# Patient Record
Sex: MALE | Race: WHITE | NOT HISPANIC OR LATINO | Employment: OTHER | URBAN - METROPOLITAN AREA
[De-identification: names, ages, dates, MRNs, and addresses within clinical notes are randomized per-mention and may not be internally consistent; named-entity substitution may affect disease eponyms.]

---

## 2020-09-18 ENCOUNTER — HOSPITAL ENCOUNTER (EMERGENCY)
Facility: HOSPITAL | Age: 65
Discharge: HOME | End: 2020-09-18
Attending: EMERGENCY MEDICINE | Admitting: EMERGENCY MEDICINE
Payer: MEDICARE

## 2020-09-18 VITALS
OXYGEN SATURATION: 99 % | WEIGHT: 208.78 LBS | DIASTOLIC BLOOD PRESSURE: 94 MMHG | BODY MASS INDEX: 33.55 KG/M2 | HEART RATE: 60 BPM | RESPIRATION RATE: 16 BRPM | HEIGHT: 66 IN | TEMPERATURE: 98.3 F | SYSTOLIC BLOOD PRESSURE: 208 MMHG

## 2020-09-18 DIAGNOSIS — K08.89 DENTALGIA: Primary | ICD-10-CM

## 2020-09-18 DIAGNOSIS — K04.7 PERIAPICAL ABSCESS: ICD-10-CM

## 2020-09-18 PROCEDURE — 96372 THER/PROPH/DIAG INJ SC/IM: CPT

## 2020-09-18 PROCEDURE — 99283 EMERGENCY DEPT VISIT LOW MDM: CPT

## 2020-09-18 PROCEDURE — 99283 EMERGENCY DEPT VISIT LOW MDM: CPT | Performed by: PHYSICIAN ASSISTANT

## 2020-09-18 RX ORDER — CLINDAMYCIN HYDROCHLORIDE 150 MG/1
300 CAPSULE ORAL ONCE
Status: COMPLETED | OUTPATIENT
Start: 2020-09-18 | End: 2020-09-18

## 2020-09-18 RX ORDER — ATENOLOL 50 MG/1
50 TABLET ORAL DAILY
COMMUNITY

## 2020-09-18 RX ORDER — ASPIRIN 81 MG/1
81 TABLET, CHEWABLE ORAL DAILY
COMMUNITY

## 2020-09-18 RX ORDER — CLOPIDOGREL BISULFATE 75 MG/1
75 TABLET ORAL DAILY
COMMUNITY

## 2020-09-18 RX ORDER — CLINDAMYCIN HYDROCHLORIDE 150 MG/1
450 CAPSULE ORAL EVERY 8 HOURS SCHEDULED
Qty: 28 CAPSULE | Refills: 0 | Status: SHIPPED | OUTPATIENT
Start: 2020-09-18 | End: 2020-09-28

## 2020-09-18 RX ORDER — KETOROLAC TROMETHAMINE 30 MG/ML
15 INJECTION, SOLUTION INTRAMUSCULAR; INTRAVENOUS ONCE
Status: COMPLETED | OUTPATIENT
Start: 2020-09-18 | End: 2020-09-18

## 2020-09-18 RX ORDER — NAPROXEN 500 MG/1
500 TABLET ORAL 2 TIMES DAILY WITH MEALS
Qty: 30 TABLET | Refills: 0 | Status: SHIPPED | OUTPATIENT
Start: 2020-09-18

## 2020-09-18 RX ADMIN — KETOROLAC TROMETHAMINE 15 MG: 30 INJECTION, SOLUTION INTRAMUSCULAR at 12:26

## 2020-09-18 RX ADMIN — CLINDAMYCIN HYDROCHLORIDE 300 MG: 150 CAPSULE ORAL at 12:26

## 2020-09-18 NOTE — DISCHARGE INSTRUCTIONS
Please take Clindamycin as directed, take naproxen as needed for pain  Return to the ED for any new or worsening symptoms as discussed

## 2020-09-18 NOTE — ED PROVIDER NOTES
History  Chief Complaint   Patient presents with    Dental Injury     Broke a tooth yesterday eating carrots to the lower L side  Can't get into the dentist til Monday, and was hoping for some antibiotics and motrin  Takes plavix and asa     Maureenbenjamín Pena is a 76year-old male with history of hypertension and tobacco abuse arriving ambulatory to the ED for evaluation of dental pain beginning yesterday evening  Patient relates that he felt a tooth in the left lower jaw crack upon eating a carrot around 8pm yesterday evening  Patient states he is scheduled for follow up with his dentist on Monday for assessment but would like to be started on antibiotics before hand  He relates that he has been taking Ibuprofen with good pain relief  Patient denies history of formal root canal or procedures, or any prior trauma to this tooth  He admits to mild facial swelling overlying area of pain  Denies fevers, chills, trismus, trouble swallowing, neck pain or stiffness, shortness of breath, nausea, vomiting, rash  History provided by:  Patient  Dental Pain   Location:  Lower  Quality:  Sharp  Severity:  Moderate  Onset quality:  Sudden  Timing:  Constant  Progression:  Unchanged  Chronicity:  New  Context: poor dentition    Associated symptoms: facial swelling    Associated symptoms: no drooling, no fever, no headaches and no neck pain    Risk factors: smoking        Prior to Admission Medications   Prescriptions Last Dose Informant Patient Reported? Taking?   aspirin 81 mg chewable tablet   Yes Yes   Sig: Chew 81 mg daily   atenolol (TENORMIN) 50 mg tablet   Yes Yes   Sig: Take 50 mg by mouth daily   clopidogrel (PLAVIX) 75 mg tablet   Yes Yes   Sig: Take 75 mg by mouth daily      Facility-Administered Medications: None       Past Medical History:   Diagnosis Date    Hypertension        Past Surgical History:   Procedure Laterality Date    CARDIAC SURGERY  2012    stent       History reviewed   No pertinent family history  I have reviewed and agree with the history as documented  E-Cigarette/Vaping    E-Cigarette Use Never User      E-Cigarette/Vaping Substances    Nicotine No     THC No     CBD No     Flavoring No     Other No     Unknown No      Social History     Tobacco Use    Smoking status: Current Every Day Smoker     Packs/day: 0 50     Types: Cigarettes    Smokeless tobacco: Never Used   Substance Use Topics    Alcohol use: Yes     Frequency: 2-3 times a week     Drinks per session: 3 or 4    Drug use: Never       Review of Systems   Constitutional: Negative for chills, fatigue and fever  HENT: Positive for dental problem and facial swelling  Negative for drooling, ear discharge, ear pain, sore throat and trouble swallowing  Respiratory: Negative for cough and shortness of breath  Gastrointestinal: Negative for nausea and vomiting  Musculoskeletal: Negative for neck pain and neck stiffness  Skin: Negative for pallor and rash  Neurological: Negative for weakness and headaches  All other systems reviewed and are negative  Physical Exam  Physical Exam  Vitals signs and nursing note reviewed  Constitutional:       General: He is not in acute distress  Appearance: He is well-developed  He is not ill-appearing or toxic-appearing  Comments: 76year-old male seated on exam table  Appears in no significant distress   HENT:      Head: Normocephalic and atraumatic  Mouth/Throat:      Mouth: Mucous membranes are moist       Dentition: Abnormal dentition  Dental tenderness and dental caries present  Pharynx: Oropharynx is clear  No pharyngeal swelling  Comments: Poor dentition with numerous tooth extractions  Teeth are discolored yellow  Malodorous breath  Handling oral secretions, no drooling  Eyes:      Conjunctiva/sclera: Conjunctivae normal       Pupils: Pupils are equal, round, and reactive to light     Neck:      Musculoskeletal: Normal range of motion and neck supple  Comments: No widening of the neck or cervical adenopathy  Cardiovascular:      Rate and Rhythm: Normal rate and regular rhythm  Heart sounds: Normal heart sounds  Pulmonary:      Effort: Pulmonary effort is normal  No respiratory distress  Breath sounds: Normal breath sounds  No wheezing  Comments: Speaking in full sentences, no evidence of respiratory distress  Abdominal:      General: Bowel sounds are normal  There is no distension  Palpations: Abdomen is soft  Tenderness: There is no abdominal tenderness  Musculoskeletal: Normal range of motion  General: No tenderness  Skin:     General: Skin is warm and dry  Capillary Refill: Capillary refill takes less than 2 seconds  Neurological:      General: No focal deficit present  Mental Status: He is alert  Mental status is at baseline  Sensory: No sensory deficit  Vital Signs  ED Triage Vitals [09/18/20 1107]   Temperature Pulse Respirations Blood Pressure SpO2   98 3 °F (36 8 °C) 60 16 (!) 208/94 99 %      Temp Source Heart Rate Source Patient Position - Orthostatic VS BP Location FiO2 (%)   Oral Monitor Lying Right arm --      Pain Score       9           Vitals:    09/18/20 1107   BP: (!) 208/94   Pulse: 60   Patient Position - Orthostatic VS: Lying         Visual Acuity      ED Medications  Medications   ketorolac (TORADOL) injection 15 mg (15 mg Intramuscular Given 9/18/20 1226)   clindamycin (CLEOCIN) capsule 300 mg (300 mg Oral Given 9/18/20 1226)       Diagnostic Studies  Results Reviewed     None                 No orders to display              Procedures  Procedures         ED Course       11:50AM Patient seen and assessed  Allergies reviewed  Patient given Toradol and Clindamycin in the emergency department and will be discharged with clindamycin and naproxen  Encouraged to keep dentist appointment as scheduled on Monday  Return parameters given   Patient stable for discharge home       Identification of Seniors at Risk      Most Recent Value   (ISAR) Identification of Seniors at Risk   Before the illness or injury that brought you to the Emergency, did you need someone to help you on a regular basis? 0 Filed at: 09/18/2020 1123   In the last 24 hours, have you needed more help than usual?  0 Filed at: 09/18/2020 1123   Have you been hospitalized for one or more nights during the past 6 months? 0 Filed at: 09/18/2020 1123   In general, do you see well?  0 Filed at: 09/18/2020 1123   In general, do you have serious problems with your memory? 0 Filed at: 09/18/2020 1123   Do you take more than three different medications every day? 1 Filed at: 09/18/2020 1123   ISAR Score  1 Filed at: 09/18/2020 1123                  MDM  Number of Diagnoses or Management Options  Dentalgia: new and does not require workup  Periapical abscess: new and does not require workup  Diagnosis management comments: This is a 76year-old male with history of hypertension and tobacco use arriving ambulatory to the ED for evaluation of dental pain and concern for dental abscess  Patient reports that he cracked his tooth yesterday evening while biting a carrot  Patient denies fevers, chills, trismus, trouble breathing or swallowing, rash  He relates he does have follow up scheduled with his Dentist on Monday  Differential diagnosis includes but not limited to: dentalgia, periapical abscess, dental fracture; history and examination are not suspicious for facial cellulitis, Ronny's angina, peritonsillar abscess, or retropharyngeal abscess given non-toxic appearance, afebrile, no constitutional complaints    Initial ED plan: abx, pain control, discharge    Final ED Assessment: Vital signs stable on arrival, examination as documented above  Patient was given Clindamycin given penicillin allergy, as well as toradol in the ED which were well tolerated and provided adequate symptomatic relief   Patient was encouraged to keep follow up with his Dentist as scheduled on Monday  Patient aware that he should return immediately to the emergency department if he develops any new or worsening symptoms  Advised smoking cessation if possible  Patient is understanding and agreeable  Stable for discharge home  Amount and/or Complexity of Data Reviewed  Review and summarize past medical records: yes  Discuss the patient with other providers: yes  Independent visualization of images, tracings, or specimens: yes    Risk of Complications, Morbidity, and/or Mortality  Presenting problems: low  Diagnostic procedures: low  Management options: low    Patient Progress  Patient progress: stable      Disposition  Final diagnoses:   Dentalgia   Periapical abscess     Time reflects when diagnosis was documented in both MDM as applicable and the Disposition within this note     Time User Action Codes Description Comment    9/18/2020 12:45 PM Stacey [place] Add [K08 [id number]] [address] [address] [facility]     9/18/2020 12:45 PM Stacey [place] Add [K04 7] Periapical abscess       ED Disposition     ED Disposition Condition Date/Time Comment    Discharge Stable Fri Sep 18, 2020 12:45 PM Remington Mccord discharge to home/self care              Follow-up Information     Follow up With Specialties Details Why Contact Info Additional Information    Your Dentist   On Monday as scheduled      [address] [facility] Emergency Department Emergency Medicine  If symptoms worsen [address] [facility] [address] ED, [address], [address]          Discharge Medication List as of 9/18/2020 12:51 PM      START taking these medications    Details   clindamycin (CLEOCIN) 150 mg capsule Take 3 capsules (450 mg total) by mouth every 8 (eight) hours for 10 days, Starting Fri 9/18/2020, Until Mon 9/28/2020, Normal      naproxen (NAPROSYN) 500 mg tablet Take 1 tablet (500 mg total) by mouth 2 (two) times a day with meals, Starting Fri 9/18/2020, Normal         CONTINUE these medications which have NOT CHANGED    Details   aspirin 81 mg chewable tablet Chew 81 mg daily, Historical Med      atenolol (TENORMIN) 50 mg tablet Take 50 mg by mouth daily, Historical Med      clopidogrel (PLAVIX) 75 mg tablet Take 75 mg by mouth daily, Historical Med           No discharge procedures on file      PDMP Review     None          ED Provider  Electronically Signed by           Blanquita Mcduffie PA-C  09/19/20 2566

## 2020-10-17 NOTE — ED ATTENDING ATTESTATION
9/18/2020  ITyler MD, saw and evaluated the patient  I have discussed the patient with the resident/non-physician practitioner and agree with the resident's/non-physician practitioner's findings, Plan of Care, and MDM as documented in the resident's/non-physician practitioner's note, except where noted  All available labs and Radiology studies were reviewed  I was present for key portions of any procedure(s) performed by the resident/non-physician practitioner and I was immediately available to provide assistance  At this point I agree with the current assessment done in the Emergency Department  I have conducted an independent evaluation of this patient a history and physical is as follows:       77 yo m with dentalgia   Ros was complete and otherwise negative  on exam has diffuse poor dentition, with perioral edema and tenderness     assessment and plan: dental pain will rx with abx and refer to dentist   ED Course         Critical Care Time  Procedures

## 2025-07-30 ENCOUNTER — OFFICE VISIT (OUTPATIENT)
Dept: URGENT CARE | Facility: CLINIC | Age: 70
End: 2025-07-30
Payer: COMMERCIAL

## 2025-07-30 VITALS
DIASTOLIC BLOOD PRESSURE: 86 MMHG | RESPIRATION RATE: 19 BRPM | SYSTOLIC BLOOD PRESSURE: 140 MMHG | WEIGHT: 196.6 LBS | BODY MASS INDEX: 31.73 KG/M2 | HEART RATE: 73 BPM | TEMPERATURE: 98.6 F | OXYGEN SATURATION: 96 %

## 2025-07-30 DIAGNOSIS — R23.3 ABNORMAL BRUISING: Primary | ICD-10-CM

## 2025-07-30 PROCEDURE — 99203 OFFICE O/P NEW LOW 30 MIN: CPT | Performed by: NURSE PRACTITIONER

## 2025-07-30 RX ORDER — ATORVASTATIN CALCIUM 20 MG/1
TABLET, FILM COATED ORAL DAILY
COMMUNITY
Start: 2025-07-10

## 2025-08-02 ENCOUNTER — HOSPITAL ENCOUNTER (INPATIENT)
Facility: HOSPITAL | Age: 70
LOS: 1 days | Discharge: HOME | DRG: 690 | End: 2025-08-04
Admitting: STUDENT IN AN ORGANIZED HEALTH CARE EDUCATION/TRAINING PROGRAM
Payer: COMMERCIAL

## 2025-08-03 ENCOUNTER — TELEPHONE (OUTPATIENT)
Dept: OTHER | Facility: HOSPITAL | Age: 70
End: 2025-08-03

## 2025-08-03 ENCOUNTER — APPOINTMENT (EMERGENCY)
Dept: ULTRASOUND IMAGING | Facility: HOSPITAL | Age: 70
DRG: 690 | End: 2025-08-03
Payer: COMMERCIAL

## 2025-08-03 ENCOUNTER — APPOINTMENT (EMERGENCY)
Dept: CT IMAGING | Facility: HOSPITAL | Age: 70
DRG: 690 | End: 2025-08-03
Payer: COMMERCIAL

## 2025-08-03 PROBLEM — I10 HYPERTENSION: Status: ACTIVE | Noted: 2025-08-03

## 2025-08-03 PROBLEM — R31.9 HEMATURIA: Status: ACTIVE | Noted: 2025-08-03

## 2025-08-03 PROBLEM — N30.90 CYSTITIS: Status: ACTIVE | Noted: 2025-08-03

## 2025-08-03 PROBLEM — N43.3 HYDROCELE: Status: ACTIVE | Noted: 2025-08-03
